# Patient Record
Sex: MALE | Race: BLACK OR AFRICAN AMERICAN | NOT HISPANIC OR LATINO | Employment: OTHER | URBAN - METROPOLITAN AREA
[De-identification: names, ages, dates, MRNs, and addresses within clinical notes are randomized per-mention and may not be internally consistent; named-entity substitution may affect disease eponyms.]

---

## 2018-12-17 ENCOUNTER — HOSPITAL ENCOUNTER (EMERGENCY)
Facility: HOSPITAL | Age: 83
Discharge: HOME | End: 2018-12-17
Attending: EMERGENCY MEDICINE
Payer: MEDICARE

## 2018-12-17 VITALS
TEMPERATURE: 98 F | WEIGHT: 220 LBS | BODY MASS INDEX: 35.36 KG/M2 | OXYGEN SATURATION: 100 % | HEIGHT: 66 IN | RESPIRATION RATE: 18 BRPM | SYSTOLIC BLOOD PRESSURE: 142 MMHG | HEART RATE: 75 BPM | DIASTOLIC BLOOD PRESSURE: 68 MMHG
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
## 2018-12-17 DIAGNOSIS — T14.8XXA SPRAIN: Primary | ICD-10-CM

## 2018-12-17 LAB
BUN SERPL-MCNC: 21 MG/DL (ref 6–30)
CHLORIDE SERPL-SCNC: 100 MMOL/L (ref 95–110)
CREAT SERPL-MCNC: 1.7 MG/DL (ref 0.5–1.4)
GLUCOSE SERPL-MCNC: 96 MG/DL (ref 70–110)
HCT VFR BLD CALC: 40 %PCV (ref 36–54)
POC IONIZED CALCIUM: 1.1 MMOL/L (ref 1.06–1.42)
POC TCO2 (MEASURED): 29 MMOL/L (ref 23–29)
POCT GLUCOSE: 87 MG/DL (ref 70–110)
POTASSIUM BLD-SCNC: 3.6 MMOL/L (ref 3.5–5.1)
SAMPLE: ABNORMAL
SODIUM BLD-SCNC: 143 MMOL/L (ref 136–145)

## 2018-12-17 PROCEDURE — 25000003 PHARM REV CODE 250: Performed by: EMERGENCY MEDICINE

## 2018-12-17 PROCEDURE — 93005 ELECTROCARDIOGRAM TRACING: CPT

## 2018-12-17 PROCEDURE — 82962 GLUCOSE BLOOD TEST: CPT

## 2018-12-17 PROCEDURE — 96374 THER/PROPH/DIAG INJ IV PUSH: CPT

## 2018-12-17 PROCEDURE — 99285 EMERGENCY DEPT VISIT HI MDM: CPT | Mod: 25

## 2018-12-17 PROCEDURE — 99284 EMERGENCY DEPT VISIT MOD MDM: CPT | Mod: ,,, | Performed by: EMERGENCY MEDICINE

## 2018-12-17 PROCEDURE — 96361 HYDRATE IV INFUSION ADD-ON: CPT

## 2018-12-17 PROCEDURE — 63600175 PHARM REV CODE 636 W HCPCS: Performed by: EMERGENCY MEDICINE

## 2018-12-17 PROCEDURE — 93010 ELECTROCARDIOGRAM REPORT: CPT | Mod: ,,, | Performed by: INTERNAL MEDICINE

## 2018-12-17 RX ORDER — NAPROXEN 375 MG/1
375 TABLET ORAL 2 TIMES DAILY WITH MEALS
Qty: 10 TABLET | Refills: 0 | Status: SHIPPED | OUTPATIENT
Start: 2018-12-17 | End: 2018-12-22

## 2018-12-17 RX ORDER — KETOROLAC TROMETHAMINE 30 MG/ML
15 INJECTION, SOLUTION INTRAMUSCULAR; INTRAVENOUS
Status: COMPLETED | OUTPATIENT
Start: 2018-12-17 | End: 2018-12-17

## 2018-12-17 RX ADMIN — KETOROLAC TROMETHAMINE 15 MG: 30 INJECTION, SOLUTION INTRAMUSCULAR at 06:12

## 2018-12-17 RX ADMIN — SODIUM CHLORIDE 500 ML: 0.9 INJECTION, SOLUTION INTRAVENOUS at 01:12

## 2018-12-17 NOTE — ED NOTES
Pt resting comfortably in bed, wife at bedside. Wife reports she got ahold of one of her sons but he has to go to work, she is attempting to get ahold of her other son at this time. Call light at bedside. Will continue to monitor.

## 2018-12-17 NOTE — ED TRIAGE NOTES
Pt presents to ed after falling out of bed tonight. Pt is poor historian with hx of dementia. According to wife pt is at baseline. Pt complaining of bilateral neck pain. Stating worse on left side than right. Denies tingling or numbness.

## 2018-12-17 NOTE — ED NOTES
Patient identifiers for Bhavesh Miranda checked and correct.  LOC: Patient is awake, alert, and aware of environment with an appropriate affect. Patient is oriented to self. Speech garbled per hx of dementia per pt wife.   APPEARANCE: Patient resting comfortably and in no acute distress. Patient is clean and well groomed, patient's clothing is properly fastened.  SKIN: The skin is warm and dry. Patient has normal skin turgor and moist mucus membrances. Skin is intact; no bruising or breakdown noted.  MUSKULOSKELETAL: Patient is moving all extremities well, no obvious deformities noted. Pulses intact.   RESPIRATORY: Airway is open and patent. Respirations are spontaneous and non-labored with normal effort and rate.  CARDIAC: Patient has a normal rate and rhythm. No peripheral edema noted. Capillary refill < 3 seconds.  ABDOMEN: No distention noted. Bowel sounds active in all 4 quadrants. Soft and non-tender upon palpation.  NEUROLOGICAL: PERRL. Facial expression is symmetrical. Hand grasps are equal bilaterally. Normal sensation in all extremities when touched with finger.  Allergies reported: Review of patient's allergies indicates:  No Known Allergies

## 2018-12-17 NOTE — ED PROVIDER NOTES
Encounter Date: 12/17/2018    SCRIBE #1 NOTE: I, Ryan Pratt, am scribing for, and in the presence of,  Andi Davis DO. I have scribed the following portions of the note -       History     Chief Complaint   Patient presents with    Neck Pain     s/p found on floor at home     89 y/o male with history of HTN presents via EMS to ED c/o neck and head pain. Pt endorses hitting his head attempting to get out of bed.  Onset was sudden, associated with fall from bed without loss of consciousness.  Patient denies any visual changes, severe headache, back pain, leg pain, fevers, chills, abdominal pain, chest pain or any previous trauma. No other aggravating or alleviating factors.  Complaining of 4/10 neck pain to the lateral neck from fall with surrounding contusion but no significant ecchymosis or edema. He is neurovascular intact, with no focal deficits.    Patient wife present.          Review of patient's allergies indicates:  No Known Allergies  Past Medical History:   Diagnosis Date    Hypertension      No past surgical history on file.  No family history on file.  Social History     Tobacco Use    Smoking status: Never Smoker    Smokeless tobacco: Never Used   Substance Use Topics    Alcohol use: No    Drug use: Not on file     Review of Systems   Constitutional: Negative for activity change, appetite change, fatigue and fever.   HENT: Negative for drooling, facial swelling and sinus pain.    Eyes: Negative for pain, discharge and redness.   Respiratory: Negative for apnea and shortness of breath.    Cardiovascular: Negative for chest pain and palpitations.   Gastrointestinal: Negative for diarrhea, nausea and vomiting.   Genitourinary: Negative for difficulty urinating and flank pain.   Neurological: Positive for speech difficulty. Negative for dizziness, syncope, weakness, light-headedness and headaches.   Psychiatric/Behavioral: Negative for confusion and decreased concentration.       Physical Exam      Initial Vitals [12/17/18 0128]   BP Pulse Resp Temp SpO2   (!) 176/96 90 18 97.8 °F (36.6 °C) 98 %      MAP       --         Physical Exam    Nursing note and vitals reviewed.  Constitutional: He appears well-developed and well-nourished. No distress.   HENT:   Head: Normocephalic and atraumatic.   Eyes: Conjunctivae are normal. Pupils are equal, round, and reactive to light.   Neck: Normal range of motion. Neck supple.   Cardiovascular: Regular rhythm.   No murmur heard.  Pulmonary/Chest: Breath sounds normal. No stridor. He has no wheezes. He has no rhonchi. He has no rales.   Abdominal: Soft. He exhibits no distension. There is no tenderness. There is no rebound and no guarding.   Musculoskeletal: He exhibits no edema.   Neurological: He is alert and oriented to person, place, and time. He has normal strength and normal reflexes. No cranial nerve deficit or sensory deficit.   Skin: No rash noted.   Psychiatric: He has a normal mood and affect.         ED Course   Procedures  Labs Reviewed   ISTAT PROCEDURE - Abnormal; Notable for the following components:       Result Value    POC Creatinine 1.7 (*)     All other components within normal limits   POCT GLUCOSE     EKG Readings: (Independently Interpreted)   Initial Reading: No STEMI. Heart Rate: 78.   Sinus rhythm. First degree AV block.  02:01 12/17/2018.     ECG Results          EKG 12-lead (Final result)  Result time 12/17/18 13:25:14    Final result by Interface, Lab In [facility] (12/17/18 13:25:14)                 Narrative:    Test Reason : (Not Selected)  Blood Pressure : 157/086 mmHG  Vent. Rate : 078 BPM     Atrial Rate : 078 BPM     P-R Int : 240 ms          QRS Dur : 120 ms      QT Int : 402 ms       P-R-T Axes : 069 -64 083 degrees     QTc Int : 458 ms    Sinus rhythm with 1st degree A-V block  Left anterior fascicular block  Intraventricular conduction defect  Cannot exclude Septal infarct ,age undetermined  Abnormal ECG  When compared with ECG of  17-FEB-2014 10:45,  Fusion complexes are no longer Present  Septal infarct is now Present  ST no longer depressed in Inferior leads  T wave inversion no longer evident in Inferior leads  T wave inversion less evident in Anterior-lateral leads  Confirmed by FINA FREIRE MD ([id number]) on 12/17/2018 1:25:04 PM    Referred By: MICHAELA   SELF           Confirmed By:FINA FREIRE MD                            Imaging Results          CT Head Without Contrast (Final result)  Result time 12/17/18 05:00:52    Final result by Nicole Brantley MD (12/17/18 05:00:52)                 Impression:      1. No CT evidence of acute intracranial abnormality. Clinical correlation and further evaluation as warranted.  2. 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      Electronically signed by: Nicole Brantley MD  Date:    12/17/2018  Time:    05:00             Narrative:    EXAMINATION:  CT HEAD WITHOUT CONTRAST    CLINICAL HISTORY:  fall;    TECHNIQUE:  Low dose axial images were obtained through the head.  Coronal and sagittal reformations were also performed. Contrast was not administered.    COMPARISON:  Head CT 05/05/2009    FINDINGS:  There is no acute intracranial hemorrhage, hydrocephalus, midline shift or mass effect. There is mild generalized cerebral volume loss.  There is supratentorial and periventricular white matter hypoattenuation which is nonspecific but likely reflect sequela of chronic microvascular ischemic change.  There the remote right thalamic and left basal ganglia lacunar type infarct.  Gray-white matter differentiation otherwise appears maintained.  The basilar cisterns are patent.  The mastoid air cells and paranasal sinuses are clear of acute process. The visualized bones of the calvarium demonstrate no acute osseous abnormality.                               CT Cervical Spine Without  Contrast (Final result)  Result time 12/17/18 04:59:50    Final result by Nicole Brantley MD (12/17/18 04:59:50)                 Impression:      1. No CT evidence of acute cervical spine fracture or traumatic subluxation.  Clinical correlation and further evaluation as warranted.  2. Multilevel degenerative change of the cervical spine.      Electronically signed by: Nicole Brantley MD  Date:    12/17/2018  Time:    04:59             Narrative:    EXAMINATION:  CT CERVICAL SPINE WITHOUT CONTRAST    CLINICAL HISTORY:  fall;    TECHNIQUE:  Low dose axial images, sagittal and coronal reformations were performed though the cervical spine.  Contrast was not administered.    COMPARISON:  None    FINDINGS:  Cervical vertebral body alignment is within normal limits.  Vertebral body heights appear maintained.  The facet joints articulate appropriately.  There is multilevel intervertebral disc height loss most pronounced at the C5-C6 and C6-C7 levels.  There is no prevertebral soft tissue swelling.  No evidence of acute fracture or traumatic subluxation.  The visualized skull base is intact.  There is multilevel degenerative change of the cervical spine primarily consisting of the posterior disc osteophyte complexes, uncovertebral joint DJD and facet arthropathy resulting in varying degrees of spinal canal and neural foraminal narrowing.  Visualized soft tissues demonstrate no significant abnormalities.  The visualized lung apices are free of pleural fluid or focal consolidation.                                 Medical Decision Making:   History:   Old Medical Records: I decided to obtain old medical records.  Initial Assessment:   89 y/o male with a history of hypertension presents with acute fall from bed complaining of lateral neck pain and headache.  Differential Diagnosis:   Differential diagnosis includes but is not limited to:  ICH, subdural hematoma, epidural hematoma, skull fracture, cervical fracture, cervical  dislocation, contusion, sprain/strain, ACS..    Clinical Tests:   Lab Tests: Ordered and Reviewed  Radiological Study: Ordered and Reviewed  Medical Tests: Ordered and Reviewed    Emergent evaluation of patient presenting with acute fall from bed when trying to get up from his bed to go to the restroom.  Vital signs are stable and he is afebrile.  Complaining of lateral neck pain on the right side with no focal neurologic deficits, weakness or acute findings.  Doubt acute TIA or CVA.  ECG negative for any acute ischemia or arrhythmias.  Given nature of fall,  a CT head and cervical spine were both obtained with no acute findings.  No evidence of acute skull fracture, cervical dislocation or fracture.  Patient treated with Toradol, with improvement in his pain symptoms. Discharged home with anti-inflammatory for 5 days with plans to follow up with PCP in 1 week for re-evaluation.  Strict ED precautions return instructions were discussed.  No evidence of hematoma or intracranial hemorrhage seen on imaging. Patient agreeable to discharge plan. Strict ED precautions and return instructions discussed at length and patient verbalized understanding. All questions were answered and ample time was given for questions.                I, Dr. Andi Davis, personally performed the services described in this documentation. All medical record entries made by the scribe were at my direction and in my presence.  I have reviewed the chart and agree that the record reflects my personal performance and is accurate and complete.              Clinical Impression:   The primary encounter diagnosis was Sprain. A diagnosis of Fall was also pertinent to this visit.      Disposition:   Disposition: Discharged  Condition: Stable      Andi Davis DO  Dept of Emergency Medicine   [facility]  Spectralink: [id number]                        Andi Davis DO  12/17/18 1922